# Patient Record
Sex: FEMALE | Race: WHITE | NOT HISPANIC OR LATINO | URBAN - METROPOLITAN AREA
[De-identification: names, ages, dates, MRNs, and addresses within clinical notes are randomized per-mention and may not be internally consistent; named-entity substitution may affect disease eponyms.]

---

## 2019-05-20 ENCOUNTER — IMPORTED ENCOUNTER (OUTPATIENT)
Dept: URBAN - METROPOLITAN AREA CLINIC 50 | Facility: CLINIC | Age: 64
End: 2019-05-20

## 2019-06-14 ENCOUNTER — IMPORTED ENCOUNTER (OUTPATIENT)
Dept: URBAN - METROPOLITAN AREA CLINIC 50 | Facility: CLINIC | Age: 64
End: 2019-06-14

## 2020-07-08 ENCOUNTER — IMPORTED ENCOUNTER (OUTPATIENT)
Dept: URBAN - METROPOLITAN AREA CLINIC 50 | Facility: CLINIC | Age: 65
End: 2020-07-08

## 2020-09-04 ENCOUNTER — IMPORTED ENCOUNTER (OUTPATIENT)
Dept: URBAN - METROPOLITAN AREA CLINIC 50 | Facility: CLINIC | Age: 65
End: 2020-09-04

## 2021-04-17 ASSESSMENT — VISUAL ACUITY
OS_CC: J1+
OS_CC: 20/100+2
OS_PH: 20/40
OS_CC: J1+@ 16 IN
OS_CC: 20/20
OS_OTHER: 20/30. 20/50.
OD_PH: @ 16 IN
OD_PH: 20/30
OD_BAT: 20/40
OD_CC: 20/20
OS_CC: 20/200
OS_PH: @ 16 IN
OD_OTHER: 20/40. 20/50.
OS_BAT: 20/30
OD_CC: 20/40
OD_CC: J1+@ 16 IN
OD_CC: J1+

## 2021-04-17 ASSESSMENT — TONOMETRY
OS_IOP_MMHG: 14
OS_IOP_MMHG: 14
OD_IOP_MMHG: 14
OD_IOP_MMHG: 14

## 2022-10-07 ENCOUNTER — PREPPED CHART (OUTPATIENT)
Dept: URBAN - METROPOLITAN AREA CLINIC 52 | Facility: CLINIC | Age: 67
End: 2022-10-07

## 2022-10-13 ENCOUNTER — COMPREHENSIVE EXAM (OUTPATIENT)
Dept: URBAN - METROPOLITAN AREA CLINIC 52 | Facility: CLINIC | Age: 67
End: 2022-10-13

## 2022-10-13 DIAGNOSIS — H25.13: ICD-10-CM

## 2022-10-13 PROCEDURE — 92014 COMPRE OPH EXAM EST PT 1/>: CPT

## 2022-10-13 ASSESSMENT — VISUAL ACUITY
OD_PH: 20/30+1
OS_CC: 20/40+1
OD_GLARE: 20/40
OS_PH: 20/30
OU_CC: 20/30+1
OD_CC: 20/40+1
OS_GLARE: 20/30
OD_GLARE: 20/50
OU_CC: J1 @ 14IN
OS_GLARE: 20/50

## 2022-10-13 ASSESSMENT — TONOMETRY
OS_IOP_MMHG: 12
OD_IOP_MMHG: 12

## 2022-10-31 ENCOUNTER — PRE-OP/H&P (OUTPATIENT)
Dept: URBAN - METROPOLITAN AREA CLINIC 53 | Facility: CLINIC | Age: 67
End: 2022-10-31

## 2022-10-31 DIAGNOSIS — H25.12: ICD-10-CM

## 2022-10-31 PROCEDURE — 92025AV CORNEAL TOPOGRAPHY, AV

## 2022-10-31 PROCEDURE — PREOP PRE OP VISIT

## 2022-10-31 PROCEDURE — 92136 OPHTHALMIC BIOMETRY: CPT

## 2022-10-31 ASSESSMENT — KERATOMETRY
OS_K2POWER_DIOPTERS: 45.62
OD_AXISANGLE2_DEGREES: 140
OD_K1POWER_DIOPTERS: 46.00
OS_AXISANGLE_DEGREES: 100
OD_AXISANGLE_DEGREES: 50
OS_K1POWER_DIOPTERS: 45.75
OS_AXISANGLE2_DEGREES: 10
OD_K2POWER_DIOPTERS: 45.50

## 2022-10-31 ASSESSMENT — TONOMETRY
OD_IOP_MMHG: 15
OS_IOP_MMHG: 15

## 2022-10-31 ASSESSMENT — VISUAL ACUITY
OD_CC: 20/40
OS_CC: 20/40

## 2022-10-31 NOTE — PATIENT DISCUSSION
CV OS/OD/MONO: Discussed with patient in detail laser-assisted vs traditional cataract surgery and all available lens options as well as their associated risks, benefits, limitations and out-of-pocket costs. Patient is a candidate for [facility] OU. Patient wishes to proceed with cataract surgery with [facility] OS. Proceed with cataract surgery OD with [facility], based on confirmation of first eye results, and patient's complaint. The patient understands that a monofocal IOL will allow him/her to see clearly at one focal point and elects to proceed with monovision correction with OD set for distance and OS set for near. The patient understands that glasses may still be needed for intermediate and/or closeup work and there is no guarantee that they will not also require glasses to see their best at distance. The risks, benefits, and alternatives to surgery were discussed and the patient's questions were answered.

## 2022-11-09 ENCOUNTER — SURGERY/PROCEDURE (OUTPATIENT)
Dept: URBAN - METROPOLITAN AREA SURGERY 16 | Facility: SURGERY | Age: 67
End: 2022-11-09

## 2022-11-09 ENCOUNTER — POST-OP (OUTPATIENT)
Dept: URBAN - METROPOLITAN AREA CLINIC 48 | Facility: LOCATION | Age: 67
End: 2022-11-09

## 2022-11-09 DIAGNOSIS — H25.12: ICD-10-CM

## 2022-11-09 DIAGNOSIS — Z98.42: ICD-10-CM

## 2022-11-09 DIAGNOSIS — Z96.1: ICD-10-CM

## 2022-11-09 PROCEDURE — 99199PCV CUSTOM VISION

## 2022-11-09 PROCEDURE — 66984CV REMOVE CATARACT, INSERT LENS, CUSTOM VISION

## 2022-11-09 ASSESSMENT — KERATOMETRY
OS_K1POWER_DIOPTERS: 45.75
OS_AXISANGLE2_DEGREES: 10
OS_AXISANGLE2_DEGREES: 10
OD_AXISANGLE_DEGREES: 50
OD_K2POWER_DIOPTERS: 45.50
OD_AXISANGLE2_DEGREES: 140
OS_AXISANGLE_DEGREES: 100
OD_AXISANGLE2_DEGREES: 140
OS_AXISANGLE_DEGREES: 100
OD_K1POWER_DIOPTERS: 46.00
OS_K2POWER_DIOPTERS: 45.62
OS_K1POWER_DIOPTERS: 45.75
OD_AXISANGLE_DEGREES: 50
OS_K2POWER_DIOPTERS: 45.62
OD_K2POWER_DIOPTERS: 45.50
OD_K1POWER_DIOPTERS: 46.00

## 2022-11-09 ASSESSMENT — TONOMETRY: OS_IOP_MMHG: 21

## 2022-11-09 ASSESSMENT — VISUAL ACUITY: OS_SC: 20/200

## 2022-11-14 ENCOUNTER — POST OP/EVAL OF SECOND EYE (OUTPATIENT)
Dept: URBAN - METROPOLITAN AREA CLINIC 53 | Facility: CLINIC | Age: 67
End: 2022-11-14

## 2022-11-14 DIAGNOSIS — Z98.42: ICD-10-CM

## 2022-11-14 DIAGNOSIS — H25.11: ICD-10-CM

## 2022-11-14 DIAGNOSIS — Z96.1: ICD-10-CM

## 2022-11-14 PROCEDURE — 99213 OFFICE O/P EST LOW 20 MIN: CPT

## 2022-11-14 PROCEDURE — 92136 - 2N OPHTHALMIC BIOMETRY BY PARTIAL COHERENCE INTERFEROMETRY WITH INTRAOCULAR LENS POWER CALCULATION

## 2022-11-14 ASSESSMENT — KERATOMETRY
OD_AXISANGLE_DEGREES: 50
OS_AXISANGLE2_DEGREES: 10
OD_AXISANGLE2_DEGREES: 140
OS_K2POWER_DIOPTERS: 45.62
OS_AXISANGLE_DEGREES: 100
OD_K2POWER_DIOPTERS: 45.50
OD_K1POWER_DIOPTERS: 46.00
OS_K1POWER_DIOPTERS: 45.75

## 2022-11-14 ASSESSMENT — TONOMETRY
OS_IOP_MMHG: 13
OD_IOP_MMHG: 13

## 2022-11-14 ASSESSMENT — VISUAL ACUITY
OU_SC: J1+
OD_CC: 20/40
OD_GLARE: 20/40
OD_GLARE: 20/50
OS_SC: 20/100

## 2022-11-21 ENCOUNTER — POST-OP (OUTPATIENT)
Dept: URBAN - METROPOLITAN AREA CLINIC 48 | Facility: LOCATION | Age: 67
End: 2022-11-21

## 2022-11-21 ENCOUNTER — SURGERY/PROCEDURE (OUTPATIENT)
Dept: URBAN - METROPOLITAN AREA SURGERY 16 | Facility: SURGERY | Age: 67
End: 2022-11-21

## 2022-11-21 DIAGNOSIS — H25.11: ICD-10-CM

## 2022-11-21 DIAGNOSIS — Z98.41: ICD-10-CM

## 2022-11-21 DIAGNOSIS — Z96.1: ICD-10-CM

## 2022-11-21 PROCEDURE — 66984CV REMOVE CATARACT, INSERT LENS, CUSTOM VISION

## 2022-11-21 PROCEDURE — 99199PCV CUSTOM VISION

## 2022-11-21 ASSESSMENT — KERATOMETRY
OS_AXISANGLE2_DEGREES: 10
OS_K1POWER_DIOPTERS: 45.75
OD_AXISANGLE_DEGREES: 50
OS_AXISANGLE2_DEGREES: 10
OD_AXISANGLE2_DEGREES: 140
OD_K1POWER_DIOPTERS: 46.00
OS_K2POWER_DIOPTERS: 45.62
OS_K1POWER_DIOPTERS: 45.75
OS_AXISANGLE_DEGREES: 100
OD_AXISANGLE_DEGREES: 50
OS_AXISANGLE_DEGREES: 100
OD_AXISANGLE2_DEGREES: 140
OS_K2POWER_DIOPTERS: 45.62
OD_K1POWER_DIOPTERS: 46.00
OD_K2POWER_DIOPTERS: 45.50
OD_K2POWER_DIOPTERS: 45.50

## 2022-11-21 ASSESSMENT — VISUAL ACUITY: OD_SC: 20/70

## 2022-11-21 ASSESSMENT — TONOMETRY: OD_IOP_MMHG: 25

## 2022-11-21 NOTE — PROCEDURE NOTE: SURGICAL
"<span style=""color: [id number]; font-family: dino [facility]

## 2022-11-28 ENCOUNTER — POST-OP (OUTPATIENT)
Dept: URBAN - METROPOLITAN AREA CLINIC 53 | Facility: CLINIC | Age: 67
End: 2022-11-28

## 2022-11-28 DIAGNOSIS — Z96.1: ICD-10-CM

## 2022-11-28 DIAGNOSIS — Z98.41: ICD-10-CM

## 2022-11-28 PROCEDURE — 99024 POSTOP FOLLOW-UP VISIT: CPT

## 2022-11-28 ASSESSMENT — KERATOMETRY
OD_K1POWER_DIOPTERS: 46.00
OD_AXISANGLE_DEGREES: 50
OS_AXISANGLE2_DEGREES: 10
OS_K2POWER_DIOPTERS: 45.62
OS_K1POWER_DIOPTERS: 45.75
OD_AXISANGLE2_DEGREES: 140
OS_AXISANGLE_DEGREES: 100
OD_K2POWER_DIOPTERS: 45.50

## 2022-11-28 ASSESSMENT — TONOMETRY
OS_IOP_MMHG: 12
OD_IOP_MMHG: 12

## 2022-11-28 ASSESSMENT — VISUAL ACUITY
OD_SC: 20/25-2
OS_PH: 20/60+3
OD_SC: J2@16"
OS_SC: 20/250

## 2022-12-19 ENCOUNTER — POST-OP (OUTPATIENT)
Dept: URBAN - METROPOLITAN AREA CLINIC 53 | Facility: CLINIC | Age: 67
End: 2022-12-19

## 2022-12-19 PROCEDURE — 99024 POSTOP FOLLOW-UP VISIT: CPT

## 2022-12-19 PROCEDURE — 92015 DETERMINE REFRACTIVE STATE: CPT

## 2022-12-19 ASSESSMENT — KERATOMETRY
OS_AXISANGLE_DEGREES: 100
OS_K2POWER_DIOPTERS: 45.62
OS_K1POWER_DIOPTERS: 45.75
OD_AXISANGLE_DEGREES: 50
OD_AXISANGLE2_DEGREES: 140
OD_K1POWER_DIOPTERS: 46.00
OS_AXISANGLE2_DEGREES: 10
OD_K2POWER_DIOPTERS: 45.50

## 2022-12-19 ASSESSMENT — TONOMETRY
OS_IOP_MMHG: 13
OD_IOP_MMHG: 13

## 2022-12-19 ASSESSMENT — VISUAL ACUITY
OS_PH: 20/30
OS_SC: J1+@14"
OU_SC: 20/20-1
OD_SC: J5@14"
OS_SC: 20/200
OD_SC: 20/25+2
OU_SC: J1+@14"

## 2024-01-22 ENCOUNTER — COMPREHENSIVE EXAM (OUTPATIENT)
Dept: URBAN - METROPOLITAN AREA CLINIC 53 | Facility: CLINIC | Age: 69
End: 2024-01-22

## 2024-01-22 DIAGNOSIS — H04.123: ICD-10-CM

## 2024-01-22 DIAGNOSIS — Z96.1: ICD-10-CM

## 2024-01-22 PROCEDURE — 92014 COMPRE OPH EXAM EST PT 1/>: CPT

## 2024-01-22 ASSESSMENT — VISUAL ACUITY
OS_GLARE: 20/20
OS_PH: 20/40
OU_SC: J1+@16
OD_GLARE: 20/20
OD_GLARE: 20/20
OS_SC: 20/250
OS_GLARE: 20/20
OD_SC: 20/20

## 2024-01-22 ASSESSMENT — KERATOMETRY
OS_AXISANGLE2_DEGREES: 109
OS_AXISANGLE_DEGREES: 19
OS_K2POWER_DIOPTERS: 46.00
OD_K1POWER_DIOPTERS: 45.75
OD_AXISANGLE_DEGREES: 163
OS_K1POWER_DIOPTERS: 45.75
OD_AXISANGLE2_DEGREES: 73
OD_K2POWER_DIOPTERS: 46.25

## 2024-01-22 ASSESSMENT — TONOMETRY
OD_IOP_MMHG: 13
OS_IOP_MMHG: 13

## 2025-01-27 ENCOUNTER — COMPREHENSIVE EXAM (OUTPATIENT)
Age: 70
End: 2025-01-27

## 2025-01-27 DIAGNOSIS — H26.491: ICD-10-CM

## 2025-01-27 DIAGNOSIS — H52.4: ICD-10-CM

## 2025-01-27 DIAGNOSIS — H04.123: ICD-10-CM

## 2025-01-27 PROCEDURE — 99214 OFFICE O/P EST MOD 30 MIN: CPT
